# Patient Record
Sex: MALE | Race: BLACK OR AFRICAN AMERICAN | NOT HISPANIC OR LATINO | Employment: UNEMPLOYED | URBAN - METROPOLITAN AREA
[De-identification: names, ages, dates, MRNs, and addresses within clinical notes are randomized per-mention and may not be internally consistent; named-entity substitution may affect disease eponyms.]

---

## 2023-03-27 ENCOUNTER — HOSPITAL ENCOUNTER (EMERGENCY)
Facility: CLINIC | Age: 33
Discharge: HOME | End: 2023-03-28
Attending: EMERGENCY MEDICINE | Admitting: EMERGENCY MEDICINE
Payer: COMMERCIAL

## 2023-03-27 ENCOUNTER — APPOINTMENT (OUTPATIENT)
Dept: CT IMAGING | Facility: CLINIC | Age: 33
End: 2023-03-27
Attending: EMERGENCY MEDICINE
Payer: COMMERCIAL

## 2023-03-27 DIAGNOSIS — R07.89 OTHER CHEST PAIN: ICD-10-CM

## 2023-03-27 DIAGNOSIS — I31.9 PERICARDITIS, UNSPECIFIED CHRONICITY, UNSPECIFIED TYPE: ICD-10-CM

## 2023-03-27 DIAGNOSIS — I10 PRIMARY HYPERTENSION: ICD-10-CM

## 2023-03-27 LAB
ANION GAP SERPL CALCULATED.3IONS-SCNC: 8 MMOL/L (ref 7–15)
BASOPHILS # BLD AUTO: 0.1 10E3/UL (ref 0–0.2)
BASOPHILS NFR BLD AUTO: 1 %
BUN SERPL-MCNC: 7 MG/DL (ref 6–20)
CALCIUM SERPL-MCNC: 9.4 MG/DL (ref 8.6–10)
CHLORIDE SERPL-SCNC: 100 MMOL/L (ref 98–107)
CREAT SERPL-MCNC: 0.89 MG/DL (ref 0.67–1.17)
D DIMER PPP FEU-MCNC: 0.82 UG/ML FEU (ref 0–0.5)
DEPRECATED HCO3 PLAS-SCNC: 30 MMOL/L (ref 22–29)
EOSINOPHIL # BLD AUTO: 0.1 10E3/UL (ref 0–0.7)
EOSINOPHIL NFR BLD AUTO: 1 %
ERYTHROCYTE [DISTWIDTH] IN BLOOD BY AUTOMATED COUNT: 13.2 % (ref 10–15)
GFR SERPL CREATININE-BSD FRML MDRD: >90 ML/MIN/1.73M2
GLUCOSE SERPL-MCNC: 94 MG/DL (ref 70–99)
HCT VFR BLD AUTO: 47 % (ref 40–53)
HGB BLD-MCNC: 15.6 G/DL (ref 13.3–17.7)
HOLD SPECIMEN: NORMAL
HOLD SPECIMEN: NORMAL
IMM GRANULOCYTES # BLD: 0 10E3/UL
IMM GRANULOCYTES NFR BLD: 0 %
INR PPP: 1.06 (ref 0.85–1.15)
LYMPHOCYTES # BLD AUTO: 4.8 10E3/UL (ref 0.8–5.3)
LYMPHOCYTES NFR BLD AUTO: 42 %
MCH RBC QN AUTO: 25.6 PG (ref 26.5–33)
MCHC RBC AUTO-ENTMCNC: 33.2 G/DL (ref 31.5–36.5)
MCV RBC AUTO: 77 FL (ref 78–100)
MONOCYTES # BLD AUTO: 0.7 10E3/UL (ref 0–1.3)
MONOCYTES NFR BLD AUTO: 6 %
NEUTROPHILS # BLD AUTO: 5.9 10E3/UL (ref 1.6–8.3)
NEUTROPHILS NFR BLD AUTO: 50 %
NRBC # BLD AUTO: 0 10E3/UL
NRBC BLD AUTO-RTO: 0 /100
PLATELET # BLD AUTO: 214 10E3/UL (ref 150–450)
POTASSIUM SERPL-SCNC: 3.8 MMOL/L (ref 3.4–5.3)
RBC # BLD AUTO: 6.1 10E6/UL (ref 4.4–5.9)
SODIUM SERPL-SCNC: 138 MMOL/L (ref 136–145)
TROPONIN T SERPL HS-MCNC: <6 NG/L
WBC # BLD AUTO: 11.6 10E3/UL (ref 4–11)

## 2023-03-27 PROCEDURE — 71275 CT ANGIOGRAPHY CHEST: CPT

## 2023-03-27 PROCEDURE — 80048 BASIC METABOLIC PNL TOTAL CA: CPT | Performed by: EMERGENCY MEDICINE

## 2023-03-27 PROCEDURE — 85379 FIBRIN DEGRADATION QUANT: CPT | Performed by: EMERGENCY MEDICINE

## 2023-03-27 PROCEDURE — 93005 ELECTROCARDIOGRAM TRACING: CPT

## 2023-03-27 PROCEDURE — 250N000011 HC RX IP 250 OP 636: Performed by: EMERGENCY MEDICINE

## 2023-03-27 PROCEDURE — 96360 HYDRATION IV INFUSION INIT: CPT | Mod: 59

## 2023-03-27 PROCEDURE — 36415 COLL VENOUS BLD VENIPUNCTURE: CPT | Performed by: EMERGENCY MEDICINE

## 2023-03-27 PROCEDURE — 99285 EMERGENCY DEPT VISIT HI MDM: CPT | Mod: 25

## 2023-03-27 PROCEDURE — 258N000003 HC RX IP 258 OP 636: Performed by: EMERGENCY MEDICINE

## 2023-03-27 PROCEDURE — 85025 COMPLETE CBC W/AUTO DIFF WBC: CPT | Performed by: EMERGENCY MEDICINE

## 2023-03-27 PROCEDURE — 85610 PROTHROMBIN TIME: CPT | Performed by: EMERGENCY MEDICINE

## 2023-03-27 PROCEDURE — 84484 ASSAY OF TROPONIN QUANT: CPT | Performed by: EMERGENCY MEDICINE

## 2023-03-27 RX ORDER — IOPAMIDOL 755 MG/ML
500 INJECTION, SOLUTION INTRAVASCULAR ONCE
Status: COMPLETED | OUTPATIENT
Start: 2023-03-27 | End: 2023-03-27

## 2023-03-27 RX ORDER — COLCHICINE 0.6 MG/1
0.6 TABLET ORAL 2 TIMES DAILY
Qty: 20 TABLET | Refills: 0 | Status: SHIPPED | OUTPATIENT
Start: 2023-03-27 | End: 2023-04-06

## 2023-03-27 RX ORDER — IBUPROFEN 800 MG/1
800 TABLET, FILM COATED ORAL EVERY 8 HOURS PRN
Qty: 15 TABLET | Refills: 0 | Status: SHIPPED | OUTPATIENT
Start: 2023-03-27 | End: 2023-04-04

## 2023-03-27 RX ADMIN — IOPAMIDOL 85 ML: 755 INJECTION, SOLUTION INTRAVENOUS at 23:04

## 2023-03-27 RX ADMIN — SODIUM CHLORIDE 1000 ML: 9 INJECTION, SOLUTION INTRAVENOUS at 21:59

## 2023-03-27 ASSESSMENT — ACTIVITIES OF DAILY LIVING (ADL): ADLS_ACUITY_SCORE: 35

## 2023-03-28 VITALS
OXYGEN SATURATION: 98 % | HEART RATE: 68 BPM | WEIGHT: 239.2 LBS | RESPIRATION RATE: 22 BRPM | SYSTOLIC BLOOD PRESSURE: 170 MMHG | BODY MASS INDEX: 35.32 KG/M2 | TEMPERATURE: 98.8 F | DIASTOLIC BLOOD PRESSURE: 106 MMHG

## 2023-03-28 LAB
ATRIAL RATE - MUSE: 87 BPM
DIASTOLIC BLOOD PRESSURE - MUSE: NORMAL MMHG
INTERPRETATION ECG - MUSE: NORMAL
P AXIS - MUSE: 37 DEGREES
PR INTERVAL - MUSE: 178 MS
QRS DURATION - MUSE: 88 MS
QT - MUSE: 364 MS
QTC - MUSE: 438 MS
R AXIS - MUSE: 45 DEGREES
SYSTOLIC BLOOD PRESSURE - MUSE: NORMAL MMHG
T AXIS - MUSE: 43 DEGREES
VENTRICULAR RATE- MUSE: 87 BPM

## 2023-03-28 NOTE — ED PROVIDER NOTES
History     Chief Complaint:  Chest Pain       HPI   Tyrese Witt is a 32 year old male with a history of hypertension who has not been on his meds for several years but is otherwise healthy who presents with sharp discrete left-sided chest pain.  The patient has reported nothing out of the ordinary is happened other than recent URI symptoms with cough but negative COVID test.  Yesterday he started to have symptoms of the discrete chest pain whenever he takes a deep breath or moves he has increasing pain.  He denies any trauma heavy lifting falls no abdominal pain no bowel or bladder issues.  The patient said has not had this before he says movement definitely makes the pain worse.  Patient went to an urgent care and was sent to the ER.      Independent Historian: Mother      Review of External Notes: Urgent care records for comparison of the EKG as well as blood pressure    ROS:  Review of Systems  10 point review of systems all negative except as in HPI    Allergies:  No Known Drug Allergies     Medications:    colchicine (COLCYRS) 0.6 MG tablet  ibuprofen (ADVIL/MOTRIN) 800 MG tablet        Past Medical History:    Past Medical History:   Diagnosis Date     BIRTH ASPHYXIA NOS      INADEQUATE DEVELOPMENT, UNSPEC       INFECTION NEC        Past Surgical History:    Past Surgical History:   Procedure Laterality Date     NONSPECIFIC PROCEDURE      adenoidectomy at age 3 1/2 months        Family History:    family history is not on file.    Social History:   reports that he has never smoked. He does not have any smokeless tobacco history on file.  PCP: Balta Ashraf (Inactive)     Physical Exam     Patient Vitals for the past 24 hrs:   BP Temp Temp src Pulse Resp SpO2 Weight   23 0000 (!) 170/106 -- -- 68 22 98 % --   235 (!) 179/105 -- -- 80 -- 100 % --   23 (!) 132/113 -- -- 73 23 99 % --   23 -- -- -- -- -- -- 108.5 kg (239 lb 3.2 oz)   23 (!)  188/131 98.8  F (37.1  C) Oral 73 18 100 % --        Physical Exam  General: The patient is alert, in no respiratory distress.    HENT: Mucous membranes moist.    Cardiovascular: Regular rate and rhythm. Good pulses in all four extremities. Normal capillary refill and skin turgor.     Respiratory: Lungs are clear. No nasal flaring. No retractions. No wheezing, no crackles.    Gastrointestinal: Abdomen soft. No guarding, no rebound. No palpable hernias.     Musculoskeletal: No gross deformity.  Patient has left pectoralis area tenderness and pain with lifting both arms    Skin: No rashes or petechiae.     Neurologic: The patient is alert and oriented x3. GCS 15.  Follows commands with clear and appropriate speech. Gives appropriate answers. Good strength in all extremities. No gross neurologic deficit. Gross sensation intact. Pupils are round and reactive. No meningismus.     Lymphatic: No cervical adenopathy. No lower extremity swelling.    Psychiatric: The patient is non-tearful.      Emergency Department Course   ECG  ECG taken at 2028  Normal sinus rhythm with CT depression depression  Ventricular of 87 CT interval 178 QRS duration of 88 QTc of 438  Imaging:  CT Chest Pulmonary Embolism w Contrast   Final Result   IMPRESSION:   1.  No evidence of pulmonary embolus to the segmental level.   2.  Bibasilar subsegmental atelectatic changes    3.  Trace left sided pleural effusion        Report per radiology    Laboratory:  Labs Ordered and Resulted from Time of ED Arrival to Time of ED Departure   BASIC METABOLIC PANEL - Abnormal       Result Value    Sodium 138      Potassium 3.8      Chloride 100      Carbon Dioxide (CO2) 30 (*)     Anion Gap 8      Urea Nitrogen 7.0      Creatinine 0.89      Calcium 9.4      Glucose 94      GFR Estimate >90     D DIMER QUANTITATIVE - Abnormal    D-Dimer Quantitative 0.82 (*)    CBC WITH PLATELETS AND DIFFERENTIAL - Abnormal    WBC Count 11.6 (*)     RBC Count 6.10 (*)      Hemoglobin 15.6      Hematocrit 47.0      MCV 77 (*)     MCH 25.6 (*)     MCHC 33.2      RDW 13.2      Platelet Count 214      % Neutrophils 50      % Lymphocytes 42      % Monocytes 6      % Eosinophils 1      % Basophils 1      % Immature Granulocytes 0      NRBCs per 100 WBC 0      Absolute Neutrophils 5.9      Absolute Lymphocytes 4.8      Absolute Monocytes 0.7      Absolute Eosinophils 0.1      Absolute Basophils 0.1      Absolute Immature Granulocytes 0.0      Absolute NRBCs 0.0     TROPONIN T, HIGH SENSITIVITY - Normal    Troponin T, High Sensitivity <6     INR - Normal    INR 1.06          Procedures       Emergency Department Course & Assessments:             Interventions:  Medications   0.9% sodium chloride BOLUS (0 mLs Intravenous Stopped 3/27/23 2259)   iopamidol (ISOVUE-370) solution 500 mL (85 mLs Intravenous $Given 3/27/23 2304)   sodium chloride (PF) 0.9% PF flush 100 mL (100 mLs Intravenous $Given 3/27/23 2304)        Independent Interpretation (X-rays, CTs, rhythm strip):  I reviewed the EKG from the urgent care as well as our EKG and noted on both NJ depression there is no ST elevation troponin is negative    Consultations/Discussion of Management or Tests:  Labs and troponin along with EKG make ACS unlikely       Social Determinants of Health affecting care:  Healthcare Access/Compliance       Assessments:  Patient was reassessed and discussed results.    Disposition:  The patient was discharged to home.     Impression & Plan        Medical Decision Making:  The patient did not have any trauma to suggest a traumatic cause but I felt that with movement of the patient's arm as well as torso causing pain this could be something like a muscle strain.  However in looking at the EKG at the [facility] ER and comparing them to the urgent care both show signs of NJ depression suggestive of pericarditis.  That would fit the patient's story.  While I did not hear a cardiac rub I felt that this could explain  some of his symptoms.  I did however consider PE especially with pleuritic type chest pain however thankfully his CT scan was negative.  I do not think this is ACS and that his troponin is negative there is no signs of pneumonia or pneumothorax.  I started him on both ibuprofen and colchicine due to likely pericarditis and he was discharged to follow-up with a primary care doctor.  He has not been on his blood pressure meds which would explain his elevated blood pressure it is not down from when he arrived at the ER and has a similar level is high at the urgent care but not as high on arrival here which may be secondary to anxiety or other conditions.  I do not think that his pain indicates dissection or other immediate surgical process.    Diagnosis:    ICD-10-CM    1. Other chest pain  R07.89       2. Primary hypertension  I10       3. Pericarditis, unspecified chronicity, unspecified type  I31.9            Discharge Medications:  Discharge Medication List as of 3/28/2023 12:10 AM      START taking these medications    Details   colchicine (COLCYRS) 0.6 MG tablet Take 1 tablet (0.6 mg) by mouth 2 times daily for 20 doses, Disp-20 tablet, R-0, Local Print      ibuprofen (ADVIL/MOTRIN) 800 MG tablet Take 1 tablet (800 mg) by mouth every 8 hours as needed for moderate pain (4-6), Disp-15 tablet, R-0, Local Print                    3/28/2023   No att. providers found            Maldonado Yung MD  03/28/23 0117

## 2023-03-28 NOTE — ED NOTES
PIT/Triage Evaluation    Patient presented with a history of hypertension who has not been on meds for some years.  The patient is otherwise healthy.  He reports that starting last night he developed left-sided sharp discrete chest pressure over his left chest region worse when he takes a deep breath however also when he moves.  He denies any trauma he has had recent cold symptoms with a cough no vomiting diarrhea nausea and denies other symptoms.  Patient denies history of blood clots or blood thinners.  Patient was seen in urgent care had an EKG there that was read as pericarditis sent to the ER.    Exam is notable for:  General:  No respiratory distress    Cardiovascular: Good cap refill.  Slight tachycardia noted    Respiratory: Breathing non labored.  Pain with inspiration    Musculoskeletal: Left chest wall is tender to palpation.  No crepitus.  Patient has pain with lifting both arms    Skin: No rashes or petechiae     Neurologic: Patient is intact    Psychiatric: Appropriate     Appropriate interventions for symptom management were initiated if applicable.  Appropriate diagnostic tests were initiated if indicated.    Important information for subsequent clinician:  The patient's EKG does show OR depression suspicious for pericarditis.  I did consider with his blood pressure being so elevated this could be hypertensive emergency however he lacks the other risk factors and usual presentation.  Weight and his troponin to comment further on this.  The EKG does not show signs of a STEMI.  Would favor chest wall or pericarditis as a cause.  Blood pressure needs to be rechecked it was lower at the urgent care.  I did talk to the patient about restarting with a primary regarding treatment of his blood pressure.    I briefly evaluated the patient and developed an initial plan of care. I discussed this plan and explained that this brief interaction does not constitute a full evaluation. Patient/family understands that  they should wait to be fully evaluated and discuss any test results with another clinician prior to leaving the hospital.       Maldonado Yung MD  03/27/23 [id number]

## 2023-03-28 NOTE — ED TRIAGE NOTES
Pt states chest pain and SOB since yesterday, pt went to  and was sent for further work up. Pt states pain while moving and breathing. Hx of hypertension but pt has not been taking medications for several months. ABCs intact and AOx4.      Triage Assessment     Row Name 03/27/23 2023       Triage Assessment (Adult)    Airway WDL WDL       Respiratory WDL    Respiratory WDL X;all    Rhythm/Pattern, Respiratory shortness of breath       Cardiac WDL    Cardiac WDL X;all       Chest Pain Assessment    Associated Signs/Symptoms hypertension